# Patient Record
Sex: MALE | URBAN - METROPOLITAN AREA
[De-identification: names, ages, dates, MRNs, and addresses within clinical notes are randomized per-mention and may not be internally consistent; named-entity substitution may affect disease eponyms.]

---

## 2022-02-08 ENCOUNTER — APPOINTMENT (RX ONLY)
Dept: URBAN - METROPOLITAN AREA CLINIC 79 | Facility: CLINIC | Age: 47
Setting detail: DERMATOLOGY
End: 2022-02-08

## 2022-02-08 DIAGNOSIS — L13.0 DERMATITIS HERPETIFORMIS: ICD-10-CM | Status: WELL CONTROLLED

## 2022-02-08 PROCEDURE — ? ORDER TESTS

## 2022-02-08 PROCEDURE — ? PRESCRIPTION

## 2022-02-08 PROCEDURE — 99203 OFFICE O/P NEW LOW 30 MIN: CPT

## 2022-02-08 PROCEDURE — ? COUNSELING

## 2022-02-08 RX ORDER — DAPSONE 100 MG/1
1 TABLET ORAL QD
Qty: 90 | Refills: 0 | Status: CANCELLED
Stop reason: WASHOUT

## 2022-02-08 ASSESSMENT — PAIN INTENSITY VAS: HOW INTENSE IS YOUR PAIN 0 BEING NO PAIN, 10 BEING THE MOST SEVERE PAIN POSSIBLE?: NO PAIN

## 2022-02-08 ASSESSMENT — SEVERITY ASSESSMENT: SEVERITY: MILD

## 2022-02-08 ASSESSMENT — LOCATION DETAILED DESCRIPTION DERM: LOCATION DETAILED: RIGHT SUPERIOR CENTRAL MALAR CHEEK

## 2022-02-08 ASSESSMENT — BSA RASH: BSA RASH: 1

## 2022-02-08 ASSESSMENT — LOCATION SIMPLE DESCRIPTION DERM: LOCATION SIMPLE: RIGHT CHEEK

## 2022-02-08 ASSESSMENT — LOCATION ZONE DERM: LOCATION ZONE: FACE

## 2022-02-08 NOTE — PROCEDURE: ORDER TESTS
Bill For Surgical Tray: yes
Expected Date Of Service: 02/08/2022
Billing Type: Client Bill
Performing Laboratory: 0

## 2023-03-07 ENCOUNTER — APPOINTMENT (RX ONLY)
Dept: URBAN - METROPOLITAN AREA CLINIC 79 | Facility: CLINIC | Age: 48
Setting detail: DERMATOLOGY
End: 2023-03-07

## 2023-03-07 DIAGNOSIS — L13.0 DERMATITIS HERPETIFORMIS: ICD-10-CM | Status: WELL CONTROLLED

## 2023-03-07 PROCEDURE — ? COUNSELING

## 2023-03-07 PROCEDURE — 99213 OFFICE O/P EST LOW 20 MIN: CPT

## 2023-03-07 PROCEDURE — ? ADDITIONAL NOTES

## 2023-03-07 PROCEDURE — ? ORDER TESTS

## 2023-03-07 ASSESSMENT — LOCATION DETAILED DESCRIPTION DERM: LOCATION DETAILED: LEFT PROXIMAL RADIAL DORSAL MIDDLE FINGER

## 2023-03-07 ASSESSMENT — LOCATION SIMPLE DESCRIPTION DERM: LOCATION SIMPLE: LEFT MIDDLE FINGER

## 2023-03-07 ASSESSMENT — LOCATION ZONE DERM: LOCATION ZONE: FINGER

## 2023-03-07 ASSESSMENT — SEVERITY ASSESSMENT: SEVERITY: MILD

## 2023-03-07 ASSESSMENT — PAIN INTENSITY VAS: HOW INTENSE IS YOUR PAIN 0 BEING NO PAIN, 10 BEING THE MOST SEVERE PAIN POSSIBLE?: NO PAIN

## 2023-03-07 ASSESSMENT — BSA RASH: BSA RASH: 1

## 2023-03-07 NOTE — PROCEDURE: ADDITIONAL NOTES
Render Risk Assessment In Note?: no
Additional Notes: Will send refill of Dapsone once labs received, taking 100 mg once daily
Detail Level: Simple

## 2023-05-25 ENCOUNTER — RX ONLY (OUTPATIENT)
Age: 48
Setting detail: RX ONLY
End: 2023-05-25

## 2023-05-25 RX ORDER — DAPSONE 100 MG/1
1 TABLET ORAL QD
Qty: 30 | Refills: 2 | Status: ERX

## 2023-08-22 ENCOUNTER — APPOINTMENT (RX ONLY)
Dept: URBAN - NONMETROPOLITAN AREA CLINIC 7 | Facility: CLINIC | Age: 48
Setting detail: DERMATOLOGY
End: 2023-08-22

## 2023-08-22 DIAGNOSIS — L13.0 DERMATITIS HERPETIFORMIS: ICD-10-CM

## 2023-08-22 PROCEDURE — ? ORDER TESTS

## 2023-08-22 PROCEDURE — ? COUNSELING

## 2023-08-29 ENCOUNTER — RX ONLY (OUTPATIENT)
Age: 48
Setting detail: RX ONLY
End: 2023-08-29

## 2023-08-29 RX ORDER — DAPSONE 100 MG/1
1 TABLET ORAL QD
Qty: 30 | Refills: 0 | Status: ERX

## 2023-09-19 ENCOUNTER — APPOINTMENT (RX ONLY)
Dept: URBAN - METROPOLITAN AREA CLINIC 79 | Facility: CLINIC | Age: 48
Setting detail: DERMATOLOGY
End: 2023-09-19

## 2023-09-19 DIAGNOSIS — L13.0 DERMATITIS HERPETIFORMIS: ICD-10-CM | Status: WELL CONTROLLED

## 2023-09-19 PROCEDURE — ? COUNSELING

## 2023-09-19 PROCEDURE — 99213 OFFICE O/P EST LOW 20 MIN: CPT

## 2023-09-19 PROCEDURE — ? PRESCRIPTION

## 2023-09-19 PROCEDURE — ? PRESCRIPTION MEDICATION MANAGEMENT

## 2023-09-19 RX ORDER — DAPSONE 100 MG/1
1 TABLET ORAL QD
Qty: 90 | Refills: 1 | Status: ERX

## 2023-09-19 NOTE — PROCEDURE: PRESCRIPTION MEDICATION MANAGEMENT
Render In Strict Bullet Format?: No
Detail Level: Zone
Plan: If he is able to stay gluten free and wants to try only 50 mg once daily that is ok, he still needs labs every 6 months
Continue Regimen: Dapsone 100 mg once daily, ok to send 3 month supply

## 2024-04-01 RX ORDER — DAPSONE 100 MG/1
1 TABLET ORAL QD
Qty: 90 | Refills: 0 | Status: ERX

## 2024-04-16 ENCOUNTER — APPOINTMENT (RX ONLY)
Dept: URBAN - METROPOLITAN AREA CLINIC 79 | Facility: CLINIC | Age: 49
Setting detail: DERMATOLOGY
End: 2024-04-16

## 2024-04-16 DIAGNOSIS — L13.0 DERMATITIS HERPETIFORMIS: ICD-10-CM | Status: WELL CONTROLLED

## 2024-04-16 PROCEDURE — 99213 OFFICE O/P EST LOW 20 MIN: CPT

## 2024-04-16 PROCEDURE — ? COUNSELING

## 2024-04-16 PROCEDURE — ? PRESCRIPTION MEDICATION MANAGEMENT

## 2024-04-16 PROCEDURE — ? PRESCRIPTION

## 2024-04-16 RX ORDER — DAPSONE 100 MG/1
1 TABLET ORAL QD
Qty: 90 | Refills: 1 | Status: ERX

## 2024-04-16 ASSESSMENT — PAIN INTENSITY VAS: HOW INTENSE IS YOUR PAIN 0 BEING NO PAIN, 10 BEING THE MOST SEVERE PAIN POSSIBLE?: NO PAIN

## 2024-04-16 ASSESSMENT — BSA RASH: BSA RASH: 0

## 2024-04-16 ASSESSMENT — SEVERITY ASSESSMENT: SEVERITY: CLEAR

## 2024-06-27 RX ORDER — DAPSONE 100 MG/1
1 TABLET ORAL QD
Qty: 90 | Refills: 1 | Status: ERX

## 2024-11-01 RX ORDER — DAPSONE 100 MG/1
1 TABLET ORAL QD
Qty: 90 | Refills: 1 | Status: CANCELLED
Stop reason: SDUPTHER

## 2024-11-19 ENCOUNTER — APPOINTMENT (RX ONLY)
Dept: URBAN - METROPOLITAN AREA CLINIC 79 | Facility: CLINIC | Age: 49
Setting detail: DERMATOLOGY
End: 2024-11-19

## 2024-11-19 DIAGNOSIS — Z79.899 OTHER LONG TERM (CURRENT) DRUG THERAPY: ICD-10-CM

## 2024-11-19 PROCEDURE — ? ORDER TESTS

## 2024-11-19 NOTE — PROCEDURE: ORDER TESTS
Billing Type: Client Bill
Expected Date Of Service: 11/19/2024
Bill For Surgical Tray: yes
Lab Facility: 0

## 2024-12-17 ENCOUNTER — APPOINTMENT (OUTPATIENT)
Dept: URBAN - METROPOLITAN AREA CLINIC 79 | Facility: CLINIC | Age: 49
Setting detail: DERMATOLOGY
End: 2024-12-17

## 2024-12-17 DIAGNOSIS — L13.0 DERMATITIS HERPETIFORMIS: ICD-10-CM

## 2024-12-17 PROCEDURE — ? COUNSELING

## 2024-12-17 PROCEDURE — 99213 OFFICE O/P EST LOW 20 MIN: CPT

## 2024-12-17 PROCEDURE — ? PRESCRIPTION

## 2024-12-17 PROCEDURE — ? PRESCRIPTION MEDICATION MANAGEMENT

## 2024-12-18 RX ORDER — DAPSONE 100 MG/1
1 TABLET ORAL QD
Qty: 90 | Refills: 1 | Status: ERX